# Patient Record
Sex: FEMALE | URBAN - METROPOLITAN AREA
[De-identification: names, ages, dates, MRNs, and addresses within clinical notes are randomized per-mention and may not be internally consistent; named-entity substitution may affect disease eponyms.]

---

## 2019-11-12 ENCOUNTER — TELEPHONE (OUTPATIENT)
Dept: UROLOGY | Facility: MEDICAL CENTER | Age: 39
End: 2019-11-12

## 2019-11-12 NOTE — TELEPHONE ENCOUNTER
New Patient     Hospital  Fu - please triage- Patient states she had surgery was in er 2 times but never seen at office did stent removal at home  Patient records from [facility] requested  What is the reason for the patients appointment? Kidney stone fu with pain     Do we accept the patient's insurance or is the patient Self-Pay? [employer] choice member id [id number]      Has the patient had any previous urologist(s)? [facility] saw pa    Has the patients records been requested? patient will be faxing records over    Has the patient had any outside testing done?labs/cat scan    What is the patients location preference for an office visit? [place]     Does the patient have a personal history of cancer? No

## 2019-11-12 NOTE — TELEPHONE ENCOUNTER
Called and spoke with patient  Patient states that she has been seeing [facility] and would like to transfer care to [facility]  Patient stated that [facility] is sending her for a CT scan informed patient that we will need that report and disc along with any other records  [place] fax number gave to patient to fax records over too  Scheduled patient with Herve Terry PA-C 11/20/19 at 2:15pm in the [facility] office  Office directions given to patient

## 2019-11-18 PROBLEM — N20.0 NEPHROLITHIASIS: Status: ACTIVE | Noted: 2019-11-18
